# Patient Record
(demographics unavailable — no encounter records)

---

## 2024-11-15 NOTE — PHYSICAL EXAM
[Well Developed] : well developed [Well Nourished] : well nourished [No Acute Distress] : no acute distress [Normal Conjunctiva] : normal conjunctiva [Normal Venous Pressure] : normal venous pressure [No Carotid Bruit] : no carotid bruit [Normal S1, S2] : normal S1, S2 [No Murmur] : no murmur [No Rub] : no rub [No Gallop] : no gallop [Normal] : clear lung fields, good air entry, no respiratory distress [Clear Lung Fields] : clear lung fields [Good Air Entry] : good air entry [No Respiratory Distress] : no respiratory distress  [Soft] : abdomen soft [Non Tender] : non-tender [No Masses/organomegaly] : no masses/organomegaly [Normal Bowel Sounds] : normal bowel sounds [Normal Gait] : normal gait [No Edema] : no edema [No Cyanosis] : no cyanosis [No Clubbing] : no clubbing [No Varicosities] : no varicosities [No Rash] : no rash [No Skin Lesions] : no skin lesions [Moves all extremities] : moves all extremities [No Focal Deficits] : no focal deficits [Normal Speech] : normal speech [Alert and Oriented] : alert and oriented [Normal memory] : normal memory

## 2024-11-15 NOTE — DISCUSSION/SUMMARY
[FreeTextEntry1] : In summary  83 year old male with a past medical history of Glaucoma,  hypertension, hypercholesterolemia, prediabetic, CAD s/o CABG x4 , ?Statin Intolerance vs Polymyalgia Rheumatica per Dr. Hanson - for cardiovascular care  In anticipation of his impending orthopedic surgery I will get a 3-day Zio patch to determine whether he is indeed too bradycardic and may need a Micra pacemaker ============ ============ Hypercholesterolemia, prediabetic, CAD s/o CABG x4 , ?Statin Intolerance vs Polymyalgia Rheumatica per Dr. Hanson -[facility] -Monitor Echo  -Unclear at this point whether statin intolerance/myalgias versus PMR             - Pending HMGCoA Reductase Abs, ESR, CK            - Reaction occurred 2 months following change from 40 Atorva to 80 mg            - At this point, no clear evidence either way            - However, patient wants to give statins another trial                        - Will start Crestor 5 mg QOD  Atrial fib on low dose Eliquis    B                                                                                                                                                                                                                                                                                                                                                 60 minutes spent in patient encounter explaining and formulating rationale for treatment plan. >50% of time spent in direct counseling reviewing all tests, labs, and imaging and conferring with patient, family member, and other physicians regarding patient care >50% of time spent in direct counseling reviewing all tests, labs, and imaging and conferring with patient, family member, and other physicians regarding patient care EKG - for monitoring of heart disease  [EKG obtained to assist in diagnosis and management of assessed problem(s)] : EKG obtained to assist in diagnosis and management of assessed problem(s)

## 2024-11-15 NOTE — REASON FOR VISIT
[FreeTextEntry1] : I saw this  83 year old male in f/u cardiac consultation on 11/15/24   with a past medical history of Glaucoma,  hypertension, hypercholesterolemia, prediabetic, CAD s/o CABG x4 , ?Statin Intolerance vs Polymyalgia Rheumatica per Dr. Hanson - for cardiovascular care =============== =============== 6/2022 End of January 2022 noted severe muscle weakness and had severe muscle pain- started very acutely and had difficulty getting in and out of the car or using stairs , even had difficulty using utensils - self discontinued Atorvastatin around 3/70604; CPK reportedly was borderline ; was evaluated at [facility] and had cardiac MRI for evaluation of lower EF. Was also seen by internist and agreed that should be reaction to statin and was prednisone 15 mg a day x 1 month and all symptoms were gine within 24 hours; not aware of ESR or CRP results.  Remained being off Atorvastatin.  Prednisone dose was slowly tapered down , now on prednisone 7.5 mg a day  TTE - - LVEF  Nl  The issue now is that he will need several orthopedic procedures and he has slow atrial fibrillation with a rate of about 49-50.  He is not on any medications that are slowing his rate and he may need a pacemaker.  He is a good candidate for a Micra pacemaker ----------------- -----------------

## 2025-07-07 NOTE — REASON FOR VISIT
[Annual Wellness Visit] : an annual wellness visit [FreeTextEntry1] : Comprehensive annual physical examination and follow-up for multiple medical issues.

## 2025-07-07 NOTE — HEALTH RISK ASSESSMENT
[Good] : ~his/her~  mood as  good [No] : No [Never (0 pts)] : Never (0 points) [No falls in past year] : Patient reported no falls in the past year [Yes] : takes [Never] : Never [YES] : Yes [Have you attended a firearm safety workshop or class?] : A firearm safety workshop or class has been attended. [HIV test declined] : HIV test declined [Hepatitis C test declined] : Hepatitis C test declined [None] : None [With Family] : lives with family [# of Members in Household ___] :  household currently consist of [unfilled] member(s) [Graduate School] : graduate school [] :  [# Of Children ___] : has [unfilled] children [Feels Safe at Home] : Feels safe at home [Fully functional (bathing, dressing, toileting, transferring, walking, feeding)] : Fully functional (bathing, dressing, toileting, transferring, walking, feeding) [Fully functional (using the telephone, shopping, preparing meals, housekeeping, doing laundry, using] : Fully functional and needs no help or supervision to perform IADLs (using the telephone, shopping, preparing meals, housekeeping, doing laundry, using transportation, managing medications and managing finances) [Reports normal functional visual acuity (ie: able to read med bottle)] : Reports normal functional visual acuity [Smoke Detector] : smoke detector [Carbon Monoxide Detector] : carbon monoxide detector [Safety elements used in home] : safety elements used in home [Seat Belt] :  uses seat belt [Sunscreen] : uses sunscreen [de-identified] : Exercises daily [de-identified] : Okay diet [Are there any unlocked firearms stored in your household?] : No unlocked firearms in the household. [Are there any firearms stored in your household that are loaded?] : No firearms are stored in the household loaded. [Are there any children in your household?] : No children are in the household. [Has anyone in the household been feeling low/depressed/been struggling?] : No one in the household has been feeling low/depressed/been struggling. [Change in mental status noted] : No change in mental status noted [Language] : denies difficulty with language [Behavior] : denies difficulty with behavior [Learning/Retaining New Information] : denies difficulty learning/retaining new information [Handling Complex Tasks] : denies difficulty handling complex tasks [Reasoning] : denies difficulty with reasoning [Spatial Ability and Orientation] : denies difficulty with spatial ability and orientation [Sexually Active] : not sexually active [High Risk Behavior] : no high risk behavior [Reports changes in hearing] : Reports no changes in hearing [Reports changes in vision] : Reports no changes in vision [Reports changes in dental health] : Reports no changes in dental health [Travel to Developing Areas] : does not  travel to developing areas [TB Exposure] : is not being exposed to tuberculosis [Caregiver Concerns] : does not have caregiver concerns

## 2025-07-07 NOTE — PHYSICAL EXAM
[Normal Sclera/Conjunctiva] : normal sclera/conjunctiva [Normal Outer Ear/Nose] : the outer ears and nose were normal in appearance [Normal Rate] : normal rate  [Normal S1, S2] : normal S1 and S2 [No Murmur] : no murmur heard [Normal Appearance] : normal in appearance [No Masses] : no palpable masses [No Nipple Discharge] : no nipple discharge [No Axillary Lymphadenopathy] : no axillary lymphadenopathy [Normal] : normal gait, coordination grossly intact, no focal deficits and deep tendon reflexes were 2+ and symmetric [de-identified] : Distant heart sounds irregular regular

## 2025-07-07 NOTE — REVIEW OF SYSTEMS
[Hearing Loss] : hearing loss [Postnasal Drip] : postnasal drip [Nasal Discharge] : nasal discharge [Hoarseness] : hoarseness [Frequency] : frequency [Joint Pain] : joint pain [Joint Stiffness] : joint stiffness [Negative] : Psychiatric [Earache] : no earache [Nosebleeds] : no nosebleeds [Sore Throat] : no sore throat [Dysuria] : no dysuria [Incontinence] : no incontinence [Hesitancy] : no hesitancy [Nocturia] : no nocturia [Hematuria] : no hematuria [Impotence] : no impotency [Poor Libido] : libido not poor [Muscle Pain] : no muscle pain [Muscle Weakness] : no muscle weakness [Back Pain] : no back pain [Joint Swelling] : no joint swelling

## 2025-07-07 NOTE — HISTORY OF PRESENT ILLNESS
[FreeTextEntry1] : Comprehensive annual physical examination follow-up for multiple medical issues [de-identified] : The patient is an 84-year-old male with history of coronary artery disease status post three-vessel CABG 2021, hypertension hyperlipidemia type 2 diabetes, atrial fibrillation chronic on Xarelto, peripheral vascular disease status post stent, history of colon cancer, BPH, glaucoma, GERD, history of polymyalgia rheumatica/statin intolerant, status post steroids, osteomyelitis of the toe status post antibiotics, left hip replacement 6 weeks later left knee replacement presently on prednisone 10 mg for the past 2 months, Tevin, who presents for comprehensive annual physical examination follow-up for multiple medical problems.  Patient complains of persistent left-sided leg and hip pain in spite of surgery using only Tylenol also complains about caring for elderly wife agitated cognition issues.  Denies chest pain shortness of breath polyuria polydipsia cold or heat intolerance.

## 2025-07-18 NOTE — DISCUSSION/SUMMARY
[FreeTextEntry1] : In summary  84 year old male with a past medical history of Glaucoma,  hypertension, hypercholesterolemia, prediabetic, CAD s/o CABG x4 , ?Statin Intolerance vs Polymyalgia Rheumatica per Dr. Hanson - for cardiovascular care He is on Eliquis 2.5 twice daily, for the A-fib, on rosuvastatin 20 mg for his hyperlipidemia Aldactone 25 for control of blood pressure.  I will not make any changes and we will see him again in 5 months ============ ============ Hypercholesterolemia, prediabetic, CAD s/o CABG x4 , ?Statin Intolerance vs Polymyalgia Rheumatica per Dr. Hanson -[facility] -Monitor Echo  -Unclear at this point whether statin intolerance/myalgias versus PMR             - Pending HMGCoA Reductase Abs, ESR, CK            - Reaction occurred 2 months following change from 40 Atorva to 80 mg            - At this point, no clear evidence either way            - However, patient wants to give statins another trial                        - Will start Crestor 5 mg QOD  Atrial fib on low dose Eliquis    B                                                                                                                                                                                                                                                                                                                                                 60 minutes spent in patient encounter explaining and formulating rationale for treatment plan. >50% of time spent in direct counseling reviewing all tests, labs, and imaging and conferring with patient, family member, and other physicians regarding patient care >50% of time spent in direct counseling reviewing all tests, labs, and imaging and conferring with patient, family member, and other physicians regarding patient care EKG - for monitoring of heart disease

## 2025-07-18 NOTE — REASON FOR VISIT
[FreeTextEntry1] : I saw this  84 year old male in f/u cardiac consultation on 07/18/25   with a past medical history of Glaucoma,  hypertension, hypercholesterolemia, prediabetic, CAD s/o CABG x4 , ?Statin Intolerance vs Polymyalgia Rheumatica per Dr. Hanson - for cardiovascular care This past winter while in [place]  he had surgery on his left hip and left knee, which took quite a while to recuperate from. He seems to be heading in the right direction.  An EKG from a week ago shows frequent PVCs but is otherwise unchanged His blood pressure today is adequate. =============== =============== 6/2022 End of January 2022 noted severe muscle weakness and had severe muscle pain- started very acutely and had difficulty getting in and out of the car or using stairs , even had difficulty using utensils - self discontinued Atorvastatin around 3/22975; CPK reportedly was borderline ; was evaluated at [facility] and had cardiac MRI for evaluation of lower EF. Was also seen by internist and agreed that should be reaction to statin and was prednisone 15 mg a day x 1 month and all symptoms were gine within 24 hours; not aware of ESR or CRP results.  Remained being off Atorvastatin.  Prednisone dose was slowly tapered down , now on prednisone 7.5 mg a day  TTE - - LVEF  Nl  The issue now is that he will need several orthopedic procedures and he has slow atrial fibrillation with a rate of about 49-50.  He is not on any medications that are slowing his rate and he may need a pacemaker.  He is a good candidate for a Micra pacemaker ----------------- -----------------